# Patient Record
Sex: FEMALE | Race: ASIAN | NOT HISPANIC OR LATINO
[De-identification: names, ages, dates, MRNs, and addresses within clinical notes are randomized per-mention and may not be internally consistent; named-entity substitution may affect disease eponyms.]

---

## 2022-09-29 ENCOUNTER — NON-APPOINTMENT (OUTPATIENT)
Age: 22
End: 2022-09-29

## 2022-09-29 PROBLEM — Z00.00 ENCOUNTER FOR PREVENTIVE HEALTH EXAMINATION: Status: ACTIVE | Noted: 2022-09-29

## 2022-10-06 ENCOUNTER — ASOB RESULT (OUTPATIENT)
Age: 22
End: 2022-10-06

## 2022-10-06 ENCOUNTER — APPOINTMENT (OUTPATIENT)
Dept: ANTEPARTUM | Facility: CLINIC | Age: 22
End: 2022-10-06

## 2022-10-06 PROCEDURE — 76813 OB US NUCHAL MEAS 1 GEST: CPT

## 2022-10-06 PROCEDURE — 36415 COLL VENOUS BLD VENIPUNCTURE: CPT

## 2022-10-13 LAB
ADDITIONAL US: NORMAL
CRL SCAN TWIN B: NORMAL
CRL SCAN: NORMAL
CROWN RUMP LENGTH TWIN B: NORMAL
CROWN RUMP LENGTH: 63.5 MM
DIA MOM: 1.6
DIA VALUE: 423.2 PG/ML
DOWN SYNDROME AGE RISK: NORMAL
DOWN SYNDROME INTERPRETATION: NORMAL
DOWN SYNDROME SCREENING RISK: NORMAL
FIRST TRIMESTER SCREEN COMMENTS: NORMAL
FIRST TRIMESTER SCREEN NOTE: NORMAL
FIRST TRIMESTER SCREEN RESULTS: NORMAL
FIRST TRIMESTER SCREEN TEST RESULTS: NORMAL
GEST. AGE ON COLLECTION DATE: 12.6 WEEKS
HCG MOM: 1.7
HCG VALUE: 193.4 IU/ML
MATERNAL AGE AT EDD: 25 YR
NT MOM TWIN B: NORMAL
NT TWIN B: NORMAL
NUCHAL TRANSLUCENCY (NT): 1.5 MM
NUCHAL TRANSLUCENCY MOM: 1.12
NUMBER OF FETUSES: 1
RACE: NORMAL
SONOGRAPHER ID#: NORMAL
TRISOMY 18 AGE RISK: NORMAL
TRISOMY 18 INTERPRETATION: NORMAL
TRISOMY 18 SCREENING RISK: NORMAL
WEIGHT AFP: 119 LBS

## 2022-12-01 ENCOUNTER — ASOB RESULT (OUTPATIENT)
Age: 22
End: 2022-12-01

## 2022-12-01 ENCOUNTER — APPOINTMENT (OUTPATIENT)
Dept: ANTEPARTUM | Facility: CLINIC | Age: 22
End: 2022-12-01

## 2022-12-01 PROCEDURE — 76811 OB US DETAILED SNGL FETUS: CPT

## 2022-12-01 PROCEDURE — 76817 TRANSVAGINAL US OBSTETRIC: CPT

## 2023-02-03 ENCOUNTER — APPOINTMENT (OUTPATIENT)
Dept: MATERNAL FETAL MEDICINE | Facility: CLINIC | Age: 23
End: 2023-02-03
Payer: COMMERCIAL

## 2023-02-03 ENCOUNTER — ASOB RESULT (OUTPATIENT)
Age: 23
End: 2023-02-03

## 2023-02-03 PROCEDURE — G0108 DIAB MANAGE TRN  PER INDIV: CPT | Mod: 95

## 2023-02-06 ENCOUNTER — ASOB RESULT (OUTPATIENT)
Age: 23
End: 2023-02-06

## 2023-02-06 ENCOUNTER — APPOINTMENT (OUTPATIENT)
Dept: ANTEPARTUM | Facility: CLINIC | Age: 23
End: 2023-02-06
Payer: COMMERCIAL

## 2023-02-06 PROCEDURE — 76819 FETAL BIOPHYS PROFIL W/O NST: CPT

## 2023-02-06 PROCEDURE — 76816 OB US FOLLOW-UP PER FETUS: CPT

## 2023-02-27 ENCOUNTER — APPOINTMENT (OUTPATIENT)
Dept: ANTEPARTUM | Facility: CLINIC | Age: 23
End: 2023-02-27

## 2023-02-27 ENCOUNTER — APPOINTMENT (OUTPATIENT)
Dept: MATERNAL FETAL MEDICINE | Facility: CLINIC | Age: 23
End: 2023-02-27
Payer: COMMERCIAL

## 2023-02-27 VITALS
RESPIRATION RATE: 16 BRPM | WEIGHT: 128 LBS | HEART RATE: 72 BPM | BODY MASS INDEX: 23.55 KG/M2 | SYSTOLIC BLOOD PRESSURE: 88 MMHG | OXYGEN SATURATION: 99 % | DIASTOLIC BLOOD PRESSURE: 52 MMHG | HEIGHT: 62 IN

## 2023-02-27 DIAGNOSIS — Z82.49 FAMILY HISTORY OF ISCHEMIC HEART DISEASE AND OTHER DISEASES OF THE CIRCULATORY SYSTEM: ICD-10-CM

## 2023-02-27 DIAGNOSIS — O24.419 GESTATIONAL DIABETES MELLITUS IN PREGNANCY, UNSPECIFIED CONTROL: ICD-10-CM

## 2023-02-27 DIAGNOSIS — Z83.3 FAMILY HISTORY OF DIABETES MELLITUS: ICD-10-CM

## 2023-02-27 PROCEDURE — 99213 OFFICE O/P EST LOW 20 MIN: CPT

## 2023-02-27 RX ORDER — PRENATAL VIT NO.126/IRON/FOLIC 28MG-0.8MG
28-0.8 TABLET ORAL DAILY
Refills: 0 | Status: ACTIVE | COMMUNITY
Start: 2023-02-27

## 2023-02-27 RX ORDER — LANCETS 28 GAUGE
EACH MISCELLANEOUS
Qty: 100 | Refills: 0 | Status: ACTIVE | COMMUNITY
Start: 2023-01-16

## 2023-02-27 RX ORDER — NITROFURANTOIN (MONOHYDRATE/MACROCRYSTALS) 25; 75 MG/1; MG/1
100 CAPSULE ORAL
Qty: 14 | Refills: 0 | Status: DISCONTINUED | COMMUNITY
Start: 2022-10-04

## 2023-02-27 RX ORDER — BLOOD SUGAR DIAGNOSTIC
STRIP MISCELLANEOUS
Qty: 100 | Refills: 0 | Status: ACTIVE | COMMUNITY
Start: 2023-01-16

## 2023-02-27 RX ORDER — BLOOD-GLUCOSE METER
W/DEVICE KIT MISCELLANEOUS
Qty: 1 | Refills: 0 | Status: ACTIVE | COMMUNITY
Start: 2023-01-16

## 2023-02-27 NOTE — DISCUSSION/SUMMARY
[FreeTextEntry1] : The patient is a 22-year-old G1, P0 being seen today at 33 and half weeks for gestational diabetes A1.\par \par Fetal cardiac activity was documented using external [place].  Vital signs today revealed blood pressure of 88/52 and maternal weight is 128 pounds consistent with a BMI of 23.41 kg.  Her last growth scan was performed on  and revealed a single viable intrauterine gestation with the estimated fetal weight of 3 pounds 5 ounces which was consistent with the 31st percentile.  Vertex presentation with a posterior placenta was seen and the amniotic fluid index was normal at 14.52 cm.\par \par Gestational diabetes;\par \par Dietary consultation has been performed and report was sent under separate cover.  Evaluation of the patient's diabetic flowsheets demonstrates good control of fasting and postprandial blood sugars with all values in the normal range.  Medical intervention is not recommended at this time.  The patient has a follow-up growth ultrasound scheduled next week.  In addition follow-up dietary consultation is scheduled for the end of this week and that will be postponed and moved back 2 weeks.  Weekly  testing beginning at 36 weeks will be recommended until delivery.  Problems and complications related to a diabetic pregnancy including fetal macrosomia, increased risk for operative vaginal delivery and  section, shoulder dystocia with associated morbidity mortality and increased incidence of  intensive care admission were discussed.  All of the above was discussed with the patient and her  and all of their questions were answered.\par \par COVID-19 vaccination;\par \par COVID-19 vaccination pregnancy was discussed.  The patient has received her initial vaccination with a [place] vaccine in her country.  Risks and benefits of booster vaccination were discussed and after all the patient's questions were answered the patient has declined booster vaccination during pregnancy.  She will contact your office should she have a COVID infection to discuss various treatment options.\par \par Her father has diabetes and high blood pressure.  She has no previous medical or surgical history.  She has no known allergies to medications and denies alcohol, tobacco or drug use.\par \par I spent a total of 26 minutes reviewing the patient's prenatal record, outside medical records, previous consultations and ultrasound reports counseling and coordinating care.\par \par Recommendations;\par \par 1.  Continue current ADA diet and home glucose monitoring.\par 2.  Growth scan is scheduled for next week.\par 3.  Dietary consultation in 2 weeks is recommended.\par 4.  Weekly  testing beginning at 36 weeks until delivery.\par 5.  Follow-up maternal-fetal medicine consultation as clinically indicated.

## 2023-03-06 ENCOUNTER — APPOINTMENT (OUTPATIENT)
Dept: ANTEPARTUM | Facility: CLINIC | Age: 23
End: 2023-03-06
Payer: COMMERCIAL

## 2023-03-06 ENCOUNTER — ASOB RESULT (OUTPATIENT)
Age: 23
End: 2023-03-06

## 2023-03-06 PROCEDURE — 76819 FETAL BIOPHYS PROFIL W/O NST: CPT

## 2023-03-06 PROCEDURE — 76816 OB US FOLLOW-UP PER FETUS: CPT

## 2023-03-20 ENCOUNTER — ASOB RESULT (OUTPATIENT)
Age: 23
End: 2023-03-20

## 2023-03-20 ENCOUNTER — APPOINTMENT (OUTPATIENT)
Dept: MATERNAL FETAL MEDICINE | Facility: CLINIC | Age: 23
End: 2023-03-20
Payer: COMMERCIAL

## 2023-03-20 PROCEDURE — G0108 DIAB MANAGE TRN  PER INDIV: CPT | Mod: 95

## 2023-03-21 ENCOUNTER — APPOINTMENT (OUTPATIENT)
Dept: ANTEPARTUM | Facility: CLINIC | Age: 23
End: 2023-03-21
Payer: COMMERCIAL

## 2023-03-21 ENCOUNTER — ASOB RESULT (OUTPATIENT)
Age: 23
End: 2023-03-21

## 2023-03-21 ENCOUNTER — OUTPATIENT (OUTPATIENT)
Dept: INPATIENT UNIT | Facility: HOSPITAL | Age: 23
LOS: 1 days | Discharge: HOME | End: 2023-03-21
Payer: COMMERCIAL

## 2023-03-21 DIAGNOSIS — Z3A.00 WEEKS OF GESTATION OF PREGNANCY NOT SPECIFIED: ICD-10-CM

## 2023-03-21 DIAGNOSIS — O26.899 OTHER SPECIFIED PREGNANCY RELATED CONDITIONS, UNSPECIFIED TRIMESTER: ICD-10-CM

## 2023-03-21 LAB
ABO RH CONFIRMATION: SIGNIFICANT CHANGE UP
ALBUMIN SERPL ELPH-MCNC: 2.6 G/DL — LOW (ref 3.3–5)
ALP SERPL-CCNC: 174 U/L — HIGH (ref 40–120)
ALT FLD-CCNC: 13 U/L — SIGNIFICANT CHANGE UP (ref 12–78)
ANION GAP SERPL CALC-SCNC: 7 MMOL/L — SIGNIFICANT CHANGE UP (ref 5–17)
AST SERPL-CCNC: 20 U/L — SIGNIFICANT CHANGE UP (ref 15–37)
BILIRUB SERPL-MCNC: 0.3 MG/DL — SIGNIFICANT CHANGE UP (ref 0.2–1.2)
BLD GP AB SCN SERPL QL: SIGNIFICANT CHANGE UP
BUN SERPL-MCNC: 8 MG/DL — SIGNIFICANT CHANGE UP (ref 7–23)
CALCIUM SERPL-MCNC: 9 MG/DL — SIGNIFICANT CHANGE UP (ref 8.5–10.1)
CHLORIDE SERPL-SCNC: 109 MMOL/L — HIGH (ref 96–108)
CO2 SERPL-SCNC: 19 MMOL/L — LOW (ref 22–31)
CREAT SERPL-MCNC: 0.44 MG/DL — LOW (ref 0.5–1.3)
EGFR: 138 ML/MIN/1.73M2 — SIGNIFICANT CHANGE UP
GLUCOSE SERPL-MCNC: 81 MG/DL — SIGNIFICANT CHANGE UP (ref 70–99)
HCT VFR BLD CALC: 39.5 % — SIGNIFICANT CHANGE UP (ref 34.5–45)
HGB BLD-MCNC: 13.1 G/DL — SIGNIFICANT CHANGE UP (ref 11.5–15.5)
MCHC RBC-ENTMCNC: 28.9 PG — SIGNIFICANT CHANGE UP (ref 27–34)
MCHC RBC-ENTMCNC: 33.2 GM/DL — SIGNIFICANT CHANGE UP (ref 32–36)
MCV RBC AUTO: 87.2 FL — SIGNIFICANT CHANGE UP (ref 80–100)
PLATELET # BLD AUTO: 133 K/UL — LOW (ref 150–400)
POTASSIUM SERPL-MCNC: 3.7 MMOL/L — SIGNIFICANT CHANGE UP (ref 3.5–5.3)
POTASSIUM SERPL-SCNC: 3.7 MMOL/L — SIGNIFICANT CHANGE UP (ref 3.5–5.3)
PROT SERPL-MCNC: 6.6 GM/DL — SIGNIFICANT CHANGE UP (ref 6–8.3)
RBC # BLD: 4.53 M/UL — SIGNIFICANT CHANGE UP (ref 3.8–5.2)
RBC # FLD: 14.6 % — HIGH (ref 10.3–14.5)
SODIUM SERPL-SCNC: 135 MMOL/L — SIGNIFICANT CHANGE UP (ref 135–145)
WBC # BLD: 5.82 K/UL — SIGNIFICANT CHANGE UP (ref 3.8–10.5)
WBC # FLD AUTO: 5.82 K/UL — SIGNIFICANT CHANGE UP (ref 3.8–10.5)

## 2023-03-21 PROCEDURE — 86901 BLOOD TYPING SEROLOGIC RH(D): CPT

## 2023-03-21 PROCEDURE — 87081 CULTURE SCREEN ONLY: CPT

## 2023-03-21 PROCEDURE — 87491 CHLMYD TRACH DNA AMP PROBE: CPT

## 2023-03-21 PROCEDURE — 87591 N.GONORRHOEAE DNA AMP PROB: CPT

## 2023-03-21 PROCEDURE — 59025 FETAL NON-STRESS TEST: CPT

## 2023-03-21 PROCEDURE — 86645 CMV ANTIBODY IGM: CPT

## 2023-03-21 PROCEDURE — 86778 TOXOPLASMA ANTIBODY IGM: CPT

## 2023-03-21 PROCEDURE — 86644 CMV ANTIBODY: CPT

## 2023-03-21 PROCEDURE — 99214 OFFICE O/P EST MOD 30 MIN: CPT

## 2023-03-21 PROCEDURE — 86777 TOXOPLASMA ANTIBODY: CPT

## 2023-03-21 PROCEDURE — 86780 TREPONEMA PALLIDUM: CPT

## 2023-03-21 PROCEDURE — 85027 COMPLETE CBC AUTOMATED: CPT

## 2023-03-21 PROCEDURE — 76819 FETAL BIOPHYS PROFIL W/O NST: CPT

## 2023-03-21 PROCEDURE — 86747 PARVOVIRUS ANTIBODY: CPT

## 2023-03-21 PROCEDURE — 86900 BLOOD TYPING SEROLOGIC ABO: CPT

## 2023-03-21 PROCEDURE — 80053 COMPREHEN METABOLIC PANEL: CPT

## 2023-03-21 PROCEDURE — 86850 RBC ANTIBODY SCREEN: CPT

## 2023-03-21 PROCEDURE — 36415 COLL VENOUS BLD VENIPUNCTURE: CPT

## 2023-03-21 PROCEDURE — 96372 THER/PROPH/DIAG INJ SC/IM: CPT

## 2023-03-21 RX ADMIN — Medication 12 MILLIGRAM(S): at 20:51

## 2023-03-22 ENCOUNTER — OUTPATIENT (OUTPATIENT)
Dept: INPATIENT UNIT | Facility: HOSPITAL | Age: 23
LOS: 1 days | Discharge: HOME | End: 2023-03-22
Payer: COMMERCIAL

## 2023-03-22 DIAGNOSIS — Z3A.00 WEEKS OF GESTATION OF PREGNANCY NOT SPECIFIED: ICD-10-CM

## 2023-03-22 DIAGNOSIS — O26.899 OTHER SPECIFIED PREGNANCY RELATED CONDITIONS, UNSPECIFIED TRIMESTER: ICD-10-CM

## 2023-03-22 LAB
C TRACH RRNA SPEC QL NAA+PROBE: SIGNIFICANT CHANGE UP
CMV IGG FLD QL: 7.5 U/ML — HIGH
CMV IGG SERPL-IMP: POSITIVE
CMV IGM FLD-ACNC: <8 AU/ML — SIGNIFICANT CHANGE UP
CMV IGM SERPL QL: NEGATIVE — SIGNIFICANT CHANGE UP
N GONORRHOEA RRNA SPEC QL NAA+PROBE: SIGNIFICANT CHANGE UP
SPECIMEN SOURCE: SIGNIFICANT CHANGE UP
T GONDII IGG SER QL: <3 IU/ML — SIGNIFICANT CHANGE UP
T GONDII IGG SER QL: NEGATIVE — SIGNIFICANT CHANGE UP
T GONDII IGM SER QL: <3 AU/ML — SIGNIFICANT CHANGE UP
T GONDII IGM SER QL: NEGATIVE — SIGNIFICANT CHANGE UP
T PALLIDUM AB TITR SER: NEGATIVE — SIGNIFICANT CHANGE UP

## 2023-03-22 PROCEDURE — 96372 THER/PROPH/DIAG INJ SC/IM: CPT

## 2023-03-22 PROCEDURE — 99214 OFFICE O/P EST MOD 30 MIN: CPT

## 2023-03-22 PROCEDURE — 59025 FETAL NON-STRESS TEST: CPT

## 2023-03-22 RX ADMIN — Medication 12 MILLIGRAM(S): at 21:58

## 2023-03-23 DIAGNOSIS — O26.899 OTHER SPECIFIED PREGNANCY RELATED CONDITIONS, UNSPECIFIED TRIMESTER: ICD-10-CM

## 2023-03-23 DIAGNOSIS — Z3A.00 WEEKS OF GESTATION OF PREGNANCY NOT SPECIFIED: ICD-10-CM

## 2023-03-23 LAB
CULTURE RESULTS: SIGNIFICANT CHANGE UP
SPECIMEN SOURCE: SIGNIFICANT CHANGE UP

## 2023-03-24 DIAGNOSIS — Z3A.00 WEEKS OF GESTATION OF PREGNANCY NOT SPECIFIED: ICD-10-CM

## 2023-03-24 DIAGNOSIS — O26.899 OTHER SPECIFIED PREGNANCY RELATED CONDITIONS, UNSPECIFIED TRIMESTER: ICD-10-CM

## 2023-03-28 LAB
B19V IGG SER-ACNC: 0.4 INDEX — SIGNIFICANT CHANGE UP (ref 0–0.9)
B19V IGG+IGM SER-IMP: NEGATIVE — SIGNIFICANT CHANGE UP
B19V IGG+IGM SER-IMP: SIGNIFICANT CHANGE UP
B19V IGM FLD-ACNC: 0.22 INDEX — SIGNIFICANT CHANGE UP (ref 0–0.9)
B19V IGM SER-ACNC: NEGATIVE — SIGNIFICANT CHANGE UP

## 2023-03-31 ENCOUNTER — APPOINTMENT (OUTPATIENT)
Dept: ANTEPARTUM | Facility: CLINIC | Age: 23
End: 2023-03-31
Payer: COMMERCIAL

## 2023-03-31 ENCOUNTER — ASOB RESULT (OUTPATIENT)
Age: 23
End: 2023-03-31

## 2023-03-31 PROCEDURE — 76819 FETAL BIOPHYS PROFIL W/O NST: CPT

## 2023-03-31 PROCEDURE — 76816 OB US FOLLOW-UP PER FETUS: CPT

## 2023-04-04 ENCOUNTER — APPOINTMENT (OUTPATIENT)
Dept: ANTEPARTUM | Facility: CLINIC | Age: 23
End: 2023-04-04

## 2023-04-04 ENCOUNTER — APPOINTMENT (OUTPATIENT)
Dept: MATERNAL FETAL MEDICINE | Facility: CLINIC | Age: 23
End: 2023-04-04
Payer: COMMERCIAL

## 2023-04-04 ENCOUNTER — ASOB RESULT (OUTPATIENT)
Age: 23
End: 2023-04-04

## 2023-04-04 PROCEDURE — G0108 DIAB MANAGE TRN  PER INDIV: CPT | Mod: 95

## 2023-04-07 ENCOUNTER — APPOINTMENT (OUTPATIENT)
Dept: ANTEPARTUM | Facility: CLINIC | Age: 23
End: 2023-04-07
Payer: COMMERCIAL

## 2023-04-07 ENCOUNTER — ASOB RESULT (OUTPATIENT)
Age: 23
End: 2023-04-07

## 2023-04-07 PROCEDURE — 76819 FETAL BIOPHYS PROFIL W/O NST: CPT

## 2023-04-12 ENCOUNTER — INPATIENT (INPATIENT)
Facility: HOSPITAL | Age: 23
LOS: 2 days | Discharge: HOME | End: 2023-04-15
Attending: OBSTETRICS & GYNECOLOGY | Admitting: OBSTETRICS & GYNECOLOGY
Payer: COMMERCIAL

## 2023-04-12 VITALS
WEIGHT: 128.09 LBS | SYSTOLIC BLOOD PRESSURE: 108 MMHG | HEART RATE: 101 BPM | RESPIRATION RATE: 16 BRPM | TEMPERATURE: 98 F | HEIGHT: 62 IN | DIASTOLIC BLOOD PRESSURE: 62 MMHG

## 2023-04-12 DIAGNOSIS — O35.9XX0 MATERNAL CARE FOR (SUSPECTED) FETAL ABNORMALITY AND DAMAGE, UNSPECIFIED, NOT APPLICABLE OR UNSPECIFIED: ICD-10-CM

## 2023-04-12 LAB
GLUCOSE BLDC GLUCOMTR-MCNC: 71 MG/DL — SIGNIFICANT CHANGE UP (ref 70–99)
HCT VFR BLD CALC: 41.6 % — SIGNIFICANT CHANGE UP (ref 34.5–45)
HGB BLD-MCNC: 13.8 G/DL — SIGNIFICANT CHANGE UP (ref 11.5–15.5)
IANC: 4.35 K/UL — SIGNIFICANT CHANGE UP (ref 1.8–7.4)
MCHC RBC-ENTMCNC: 28.8 PG — SIGNIFICANT CHANGE UP (ref 27–34)
MCHC RBC-ENTMCNC: 33.2 GM/DL — SIGNIFICANT CHANGE UP (ref 32–36)
MCV RBC AUTO: 86.7 FL — SIGNIFICANT CHANGE UP (ref 80–100)
PLATELET # BLD AUTO: 101 K/UL — LOW (ref 150–400)
RBC # BLD: 4.8 M/UL — SIGNIFICANT CHANGE UP (ref 3.8–5.2)
RBC # FLD: 15.9 % — HIGH (ref 10.3–14.5)
WBC # BLD: 6.94 K/UL — SIGNIFICANT CHANGE UP (ref 3.8–10.5)
WBC # FLD AUTO: 6.94 K/UL — SIGNIFICANT CHANGE UP (ref 3.8–10.5)

## 2023-04-12 RX ORDER — CHLORHEXIDINE GLUCONATE 213 G/1000ML
1 SOLUTION TOPICAL ONCE
Refills: 0 | Status: DISCONTINUED | OUTPATIENT
Start: 2023-04-12 | End: 2023-04-13

## 2023-04-12 RX ORDER — SODIUM CHLORIDE 9 MG/ML
1000 INJECTION, SOLUTION INTRAVENOUS
Refills: 0 | Status: DISCONTINUED | OUTPATIENT
Start: 2023-04-12 | End: 2023-04-13

## 2023-04-12 RX ORDER — CITRIC ACID/SODIUM CITRATE 300-500 MG
15 SOLUTION, ORAL ORAL EVERY 6 HOURS
Refills: 0 | Status: DISCONTINUED | OUTPATIENT
Start: 2023-04-12 | End: 2023-04-13

## 2023-04-12 RX ORDER — SODIUM CHLORIDE 9 MG/ML
1000 INJECTION INTRAMUSCULAR; INTRAVENOUS; SUBCUTANEOUS
Refills: 0 | Status: DISCONTINUED | OUTPATIENT
Start: 2023-04-12 | End: 2023-04-13

## 2023-04-12 RX ORDER — OXYTOCIN 10 UNIT/ML
333.33 VIAL (ML) INJECTION
Qty: 20 | Refills: 0 | Status: DISCONTINUED | OUTPATIENT
Start: 2023-04-12 | End: 2023-04-13

## 2023-04-12 NOTE — OB PROVIDER H&P - ASSESSMENT
Assessment  22y   @ 39w4d presents for IOL for GDMA1.     Plan  1. Admit to L+D. Routine Labs. IVF.  2. IOL w/ buccal cytotec.  3. Fetus: cat 1 tracing. VTX. EFW 3350g by sono. Continuous EFM. Sono. No concerns.  4. Prenatal issues: none  5. GBS neg  6. Pain: IV pain meds/epidural PRN    Plan per attending physician, Dr. Vito Kennedy MD  PGY1

## 2023-04-12 NOTE — OB PROVIDER H&P - HISTORY OF PRESENT ILLNESS
R1 Admission H&P    Subjective  HPI: 22y  @ 39w4d presents for IOL for A1 and fetus found to have bilateral pleural effusions.   +FM. -LOF. -CTXs. -VB. Pt denies any other concerns.    – PNC: Denies prenatal issues. GBS neg. EFW 3350g by ender.  – OBHx: P0  – GynHx: denies fibroids, cysts, endometriosis, abnormal pap smears, STIs  – PMH: denies  – PSH: denies  – Psych: denies   – Social: denies   – Meds: PNV   – Allergies: NKDA  – Will accept blood transfusions? Yes

## 2023-04-12 NOTE — OB PROVIDER H&P - NSHPPHYSICALEXAM_GEN_ALL_CORE
Objective  – VS  T(C): 36.9 (04-12-23 @ 21:32)  HR: 99 (04-12-23 @ 21:32)  BP: 108/62 (04-12-23 @ 21:32)  RR: 16 (04-12-23 @ 21:32)  SpO2: 96% (04-12-23 @ 21:32)    Physical Exam  CV: RRR  Pulm: breathing comfortably on RA  Abd: gravid, nontender  Extr: moving all extremities with ease    – Sono: vertex

## 2023-04-12 NOTE — OB RN PATIENT PROFILE - FALL HARM RISK - UNIVERSAL INTERVENTIONS
Bed in lowest position, wheels locked, appropriate side rails in place/Call bell, personal items and telephone in reach/Instruct patient to call for assistance before getting out of bed or chair/Non-slip footwear when patient is out of bed/[place] to call system/Physically safe environment - no spills, clutter or unnecessary equipment/Purposeful Proactive Rounding/Room/bathroom lighting operational, light cord in reach

## 2023-04-13 LAB
ALBUMIN SERPL ELPH-MCNC: 3.1 G/DL — LOW (ref 3.3–5)
ALP SERPL-CCNC: 210 U/L — HIGH (ref 40–120)
ALT FLD-CCNC: 13 U/L — SIGNIFICANT CHANGE UP (ref 4–33)
ANION GAP SERPL CALC-SCNC: 14 MMOL/L — SIGNIFICANT CHANGE UP (ref 7–14)
ANISOCYTOSIS BLD QL: SLIGHT — SIGNIFICANT CHANGE UP
APTT BLD: 25.4 SEC — LOW (ref 27–36.3)
APTT BLD: 27.5 SEC — SIGNIFICANT CHANGE UP (ref 27–36.3)
AST SERPL-CCNC: 19 U/L — SIGNIFICANT CHANGE UP (ref 4–32)
BASOPHILS # BLD AUTO: 0 K/UL — SIGNIFICANT CHANGE UP (ref 0–0.2)
BASOPHILS # BLD AUTO: 0.01 K/UL — SIGNIFICANT CHANGE UP (ref 0–0.2)
BASOPHILS NFR BLD AUTO: 0 % — SIGNIFICANT CHANGE UP (ref 0–2)
BASOPHILS NFR BLD AUTO: 0.1 % — SIGNIFICANT CHANGE UP (ref 0–2)
BILIRUB SERPL-MCNC: 0.3 MG/DL — SIGNIFICANT CHANGE UP (ref 0.2–1.2)
BLD GP AB SCN SERPL QL: NEGATIVE — SIGNIFICANT CHANGE UP
BUN SERPL-MCNC: 8 MG/DL — SIGNIFICANT CHANGE UP (ref 7–23)
CALCIUM SERPL-MCNC: 8.3 MG/DL — LOW (ref 8.4–10.5)
CHLORIDE SERPL-SCNC: 109 MMOL/L — HIGH (ref 98–107)
CO2 SERPL-SCNC: 14 MMOL/L — LOW (ref 22–31)
COVID-19 SPIKE DOMAIN AB INTERP: POSITIVE
COVID-19 SPIKE DOMAIN ANTIBODY RESULT: >250 U/ML — HIGH
CREAT SERPL-MCNC: 0.38 MG/DL — LOW (ref 0.5–1.3)
EGFR: 143 ML/MIN/1.73M2 — SIGNIFICANT CHANGE UP
EOSINOPHIL # BLD AUTO: 0 K/UL — SIGNIFICANT CHANGE UP (ref 0–0.5)
EOSINOPHIL # BLD AUTO: 0.12 K/UL — SIGNIFICANT CHANGE UP (ref 0–0.5)
EOSINOPHIL NFR BLD AUTO: 0 % — SIGNIFICANT CHANGE UP (ref 0–6)
EOSINOPHIL NFR BLD AUTO: 1.7 % — SIGNIFICANT CHANGE UP (ref 0–6)
FIBRINOGEN PPP-MCNC: 414 MG/DL — SIGNIFICANT CHANGE UP (ref 200–465)
FIBRINOGEN PPP-MCNC: 414 MG/DL — SIGNIFICANT CHANGE UP (ref 200–465)
GIANT PLATELETS BLD QL SMEAR: PRESENT — SIGNIFICANT CHANGE UP
GLUCOSE BLDC GLUCOMTR-MCNC: 102 MG/DL — HIGH (ref 70–99)
GLUCOSE BLDC GLUCOMTR-MCNC: 104 MG/DL — HIGH (ref 70–99)
GLUCOSE BLDC GLUCOMTR-MCNC: 117 MG/DL — HIGH (ref 70–99)
GLUCOSE BLDC GLUCOMTR-MCNC: 85 MG/DL — SIGNIFICANT CHANGE UP (ref 70–99)
GLUCOSE BLDC GLUCOMTR-MCNC: 90 MG/DL — SIGNIFICANT CHANGE UP (ref 70–99)
GLUCOSE BLDC GLUCOMTR-MCNC: 99 MG/DL — SIGNIFICANT CHANGE UP (ref 70–99)
GLUCOSE SERPL-MCNC: 102 MG/DL — HIGH (ref 70–99)
HCT VFR BLD CALC: 42.3 % — SIGNIFICANT CHANGE UP (ref 34.5–45)
HCT VFR BLD CALC: 42.7 % — SIGNIFICANT CHANGE UP (ref 34.5–45)
HGB BLD-MCNC: 13.3 G/DL — SIGNIFICANT CHANGE UP (ref 11.5–15.5)
HGB BLD-MCNC: 13.8 G/DL — SIGNIFICANT CHANGE UP (ref 11.5–15.5)
HYPOCHROMIA BLD QL: SLIGHT — SIGNIFICANT CHANGE UP
IANC: 7.64 K/UL — HIGH (ref 1.8–7.4)
IMM GRANULOCYTES NFR BLD AUTO: 0.5 % — SIGNIFICANT CHANGE UP (ref 0–0.9)
INR BLD: 0.97 RATIO — SIGNIFICANT CHANGE UP (ref 0.88–1.16)
INR BLD: 0.97 RATIO — SIGNIFICANT CHANGE UP (ref 0.88–1.16)
LYMPHOCYTES # BLD AUTO: 0.6 K/UL — LOW (ref 1–3.3)
LYMPHOCYTES # BLD AUTO: 0.75 K/UL — LOW (ref 1–3.3)
LYMPHOCYTES # BLD AUTO: 8.5 % — LOW (ref 13–44)
LYMPHOCYTES # BLD AUTO: 8.7 % — LOW (ref 13–44)
MACROCYTES BLD QL: SLIGHT — SIGNIFICANT CHANGE UP
MANUAL SMEAR VERIFICATION: SIGNIFICANT CHANGE UP
MCHC RBC-ENTMCNC: 28.4 PG — SIGNIFICANT CHANGE UP (ref 27–34)
MCHC RBC-ENTMCNC: 28.9 PG — SIGNIFICANT CHANGE UP (ref 27–34)
MCHC RBC-ENTMCNC: 31.4 GM/DL — LOW (ref 32–36)
MCHC RBC-ENTMCNC: 32.3 GM/DL — SIGNIFICANT CHANGE UP (ref 32–36)
MCV RBC AUTO: 89.3 FL — SIGNIFICANT CHANGE UP (ref 80–100)
MCV RBC AUTO: 90.2 FL — SIGNIFICANT CHANGE UP (ref 80–100)
MONOCYTES # BLD AUTO: 0.18 K/UL — SIGNIFICANT CHANGE UP (ref 0–0.9)
MONOCYTES # BLD AUTO: 0.38 K/UL — SIGNIFICANT CHANGE UP (ref 0–0.9)
MONOCYTES NFR BLD AUTO: 2.6 % — SIGNIFICANT CHANGE UP (ref 2–14)
MONOCYTES NFR BLD AUTO: 4.3 % — SIGNIFICANT CHANGE UP (ref 2–14)
NEUTROPHILS # BLD AUTO: 5.37 K/UL — SIGNIFICANT CHANGE UP (ref 1.8–7.4)
NEUTROPHILS # BLD AUTO: 7.64 K/UL — HIGH (ref 1.8–7.4)
NEUTROPHILS NFR BLD AUTO: 77.4 % — HIGH (ref 43–77)
NEUTROPHILS NFR BLD AUTO: 86.6 % — HIGH (ref 43–77)
NRBC # BLD: 0 /100 WBCS — SIGNIFICANT CHANGE UP (ref 0–0)
NRBC # BLD: 0 /100 WBCS — SIGNIFICANT CHANGE UP (ref 0–0)
NRBC # FLD: 0 K/UL — SIGNIFICANT CHANGE UP (ref 0–0)
NRBC # FLD: 0 K/UL — SIGNIFICANT CHANGE UP (ref 0–0)
PLAT MORPH BLD: ABNORMAL
PLATELET # BLD AUTO: 90 K/UL — LOW (ref 150–400)
PLATELET # BLD AUTO: 91 K/UL — LOW (ref 150–400)
PLATELET COUNT - ESTIMATE: ABNORMAL
POLYCHROMASIA BLD QL SMEAR: SLIGHT — SIGNIFICANT CHANGE UP
POTASSIUM SERPL-MCNC: 3.5 MMOL/L — SIGNIFICANT CHANGE UP (ref 3.5–5.3)
POTASSIUM SERPL-SCNC: 3.5 MMOL/L — SIGNIFICANT CHANGE UP (ref 3.5–5.3)
PROT SERPL-MCNC: 6 G/DL — SIGNIFICANT CHANGE UP (ref 6–8.3)
PROTHROM AB SERPL-ACNC: 11.2 SEC — SIGNIFICANT CHANGE UP (ref 10.5–13.4)
PROTHROM AB SERPL-ACNC: 11.3 SEC — SIGNIFICANT CHANGE UP (ref 10.5–13.4)
RBC # BLD: 4.69 M/UL — SIGNIFICANT CHANGE UP (ref 3.8–5.2)
RBC # BLD: 4.78 M/UL — SIGNIFICANT CHANGE UP (ref 3.8–5.2)
RBC # FLD: 16 % — HIGH (ref 10.3–14.5)
RBC # FLD: 16 % — HIGH (ref 10.3–14.5)
RBC BLD AUTO: ABNORMAL
RH IG SCN BLD-IMP: POSITIVE — SIGNIFICANT CHANGE UP
RH IG SCN BLD-IMP: POSITIVE — SIGNIFICANT CHANGE UP
SARS-COV-2 IGG+IGM SERPL QL IA: >250 U/ML — HIGH
SARS-COV-2 IGG+IGM SERPL QL IA: POSITIVE
SARS-COV-2 RNA SPEC QL NAA+PROBE: SIGNIFICANT CHANGE UP
SODIUM SERPL-SCNC: 137 MMOL/L — SIGNIFICANT CHANGE UP (ref 135–145)
T PALLIDUM AB TITR SER: NEGATIVE — SIGNIFICANT CHANGE UP
VARIANT LYMPHS # BLD: 9.6 % — HIGH (ref 0–6)
WBC # BLD: 8.82 K/UL — SIGNIFICANT CHANGE UP (ref 3.8–10.5)
WBC # BLD: 9.46 K/UL — SIGNIFICANT CHANGE UP (ref 3.8–10.5)
WBC # FLD AUTO: 8.82 K/UL — SIGNIFICANT CHANGE UP (ref 3.8–10.5)
WBC # FLD AUTO: 9.46 K/UL — SIGNIFICANT CHANGE UP (ref 3.8–10.5)

## 2023-04-13 PROCEDURE — 88307 TISSUE EXAM BY PATHOLOGIST: CPT | Mod: 26

## 2023-04-13 RX ORDER — SODIUM CHLORIDE 9 MG/ML
3 INJECTION INTRAMUSCULAR; INTRAVENOUS; SUBCUTANEOUS EVERY 8 HOURS
Refills: 0 | Status: DISCONTINUED | OUTPATIENT
Start: 2023-04-13 | End: 2023-04-15

## 2023-04-13 RX ORDER — AER TRAVELER 0.5 G/1
1 SOLUTION RECTAL; TOPICAL EVERY 4 HOURS
Refills: 0 | Status: DISCONTINUED | OUTPATIENT
Start: 2023-04-13 | End: 2023-04-15

## 2023-04-13 RX ORDER — SIMETHICONE 80 MG/1
80 TABLET, CHEWABLE ORAL EVERY 4 HOURS
Refills: 0 | Status: DISCONTINUED | OUTPATIENT
Start: 2023-04-13 | End: 2023-04-15

## 2023-04-13 RX ORDER — LANOLIN
1 OINTMENT (GRAM) TOPICAL EVERY 6 HOURS
Refills: 0 | Status: DISCONTINUED | OUTPATIENT
Start: 2023-04-13 | End: 2023-04-15

## 2023-04-13 RX ORDER — ACETAMINOPHEN 500 MG
975 TABLET ORAL
Refills: 0 | Status: DISCONTINUED | OUTPATIENT
Start: 2023-04-13 | End: 2023-04-15

## 2023-04-13 RX ORDER — OXYTOCIN 10 UNIT/ML
VIAL (ML) INJECTION
Qty: 30 | Refills: 0 | Status: DISCONTINUED | OUTPATIENT
Start: 2023-04-13 | End: 2023-04-13

## 2023-04-13 RX ORDER — OXYTOCIN 10 UNIT/ML
41.67 VIAL (ML) INJECTION
Qty: 20 | Refills: 0 | Status: DISCONTINUED | OUTPATIENT
Start: 2023-04-13 | End: 2023-04-15

## 2023-04-13 RX ORDER — IBUPROFEN 200 MG
600 TABLET ORAL EVERY 6 HOURS
Refills: 0 | Status: COMPLETED | OUTPATIENT
Start: 2023-04-13 | End: 2024-03-11

## 2023-04-13 RX ORDER — DIPHENHYDRAMINE HCL 50 MG
25 CAPSULE ORAL EVERY 6 HOURS
Refills: 0 | Status: DISCONTINUED | OUTPATIENT
Start: 2023-04-13 | End: 2023-04-15

## 2023-04-13 RX ORDER — OXYCODONE HYDROCHLORIDE 5 MG/1
5 TABLET ORAL ONCE
Refills: 0 | Status: DISCONTINUED | OUTPATIENT
Start: 2023-04-13 | End: 2023-04-15

## 2023-04-13 RX ORDER — DIBUCAINE 1 %
1 OINTMENT (GRAM) RECTAL EVERY 6 HOURS
Refills: 0 | Status: DISCONTINUED | OUTPATIENT
Start: 2023-04-13 | End: 2023-04-15

## 2023-04-13 RX ORDER — BENZOCAINE 10 %
1 GEL (GRAM) MUCOUS MEMBRANE EVERY 6 HOURS
Refills: 0 | Status: DISCONTINUED | OUTPATIENT
Start: 2023-04-13 | End: 2023-04-15

## 2023-04-13 RX ORDER — KETOROLAC TROMETHAMINE 30 MG/ML
30 SYRINGE (ML) INJECTION ONCE
Refills: 0 | Status: DISCONTINUED | OUTPATIENT
Start: 2023-04-13 | End: 2023-04-15

## 2023-04-13 RX ORDER — MAGNESIUM HYDROXIDE 400 MG/1
30 TABLET, CHEWABLE ORAL
Refills: 0 | Status: DISCONTINUED | OUTPATIENT
Start: 2023-04-13 | End: 2023-04-15

## 2023-04-13 RX ORDER — ACETAMINOPHEN 500 MG
1000 TABLET ORAL ONCE
Refills: 0 | Status: COMPLETED | OUTPATIENT
Start: 2023-04-13 | End: 2023-04-13

## 2023-04-13 RX ORDER — HYDROCORTISONE 1 %
1 OINTMENT (GRAM) TOPICAL EVERY 6 HOURS
Refills: 0 | Status: DISCONTINUED | OUTPATIENT
Start: 2023-04-13 | End: 2023-04-15

## 2023-04-13 RX ORDER — OXYCODONE HYDROCHLORIDE 5 MG/1
5 TABLET ORAL
Refills: 0 | Status: DISCONTINUED | OUTPATIENT
Start: 2023-04-13 | End: 2023-04-15

## 2023-04-13 RX ORDER — PRAMOXINE HYDROCHLORIDE 150 MG/15G
1 AEROSOL, FOAM RECTAL EVERY 4 HOURS
Refills: 0 | Status: DISCONTINUED | OUTPATIENT
Start: 2023-04-13 | End: 2023-04-15

## 2023-04-13 RX ORDER — TETANUS TOXOID, REDUCED DIPHTHERIA TOXOID AND ACELLULAR PERTUSSIS VACCINE, ADSORBED 5; 2.5; 8; 8; 2.5 [IU]/.5ML; [IU]/.5ML; UG/.5ML; UG/.5ML; UG/.5ML
0.5 SUSPENSION INTRAMUSCULAR ONCE
Refills: 0 | Status: COMPLETED | OUTPATIENT
Start: 2023-04-13

## 2023-04-13 RX ADMIN — Medication 125 MILLIUNIT(S)/MIN: at 19:12

## 2023-04-13 RX ADMIN — Medication 400 MILLIGRAM(S): at 10:28

## 2023-04-13 RX ADMIN — Medication 15 MILLILITER(S): at 06:34

## 2023-04-13 RX ADMIN — Medication 1000 MILLIGRAM(S): at 11:00

## 2023-04-13 RX ADMIN — Medication 2 MILLIUNIT(S)/MIN: at 14:35

## 2023-04-13 NOTE — OB PROVIDER LABOR PROGRESS NOTE - NS_SUBJECTIVE/OBJECTIVE_OBGYN_ALL_OB_FT
R1 Labor & Delivery Progress Note     T(C): 36.7 (23 @ 05:38), Max: 36.9 (23 @ 21:32)  HR: 96 (23 @ 08:36) (54 - 101)  BP: 121/73 (23 @ 05:38) (107/68 - 121/73)  RR: 18 (23 @ 05:38) (16 - 18)  SpO2: 97% (23 @ 08:36) (81% - 99%)    Plan: 22y y/o  @39w5d in stable condition evaluated following 2 minute deceleration. Patient found to be 4/80/-3; initial exam was 0/0/-3 at 1130p (3/12). Patient reporting worsening pain w/ contractions. Pt will be 4 an epidural when she is transferred to labor and delivery.   - LLD position   - Con't IOL  - Continuous EFM, [place]  - Con't IVF    d/w attending physician Dr. Vito Mora  PGY-1
R1 Labor & Delivery Progress Note     T(C): 36.9 (23 @ 13:06), Max: 36.9 (23 @ 21:32)  HR: 90 (23 @ 14:13) (54 - 129)  BP: 89/51 (23 @ 14:13) (82/45 - 127/82)  RR: 16 (23 @ 13:06) (16 - 18)  SpO2: 94% (23 @ 14:15) (81% - 99%)    Plan: 22y y/o  @39w5d in stable condition found to be unchanged from exam 1.5hr prior. Ctx's now q4min.  - Con't IOL w/ pitocin @230p  - Continuous EFM, [place]  - Con't IVF    d/w attending physician Dr. Vito Mora  PGY-1

## 2023-04-13 NOTE — OB NEONATOLOGY/PEDIATRICIAN DELIVERY SUMMARY - NSPEDSNEONOTESA_OBGYN_ALL_OB_FT
NICU called for fetal alert: b/l pleural effusions on fetal US; and heavy mec. 39.5wk male born via  to a 21 y/o  blood type O+ mother. Maternal history of GDMA1. Prenatal history of b/l pleural effusions. No other significant maternal or prenatal history. PNL -/-/NR/I, GBS - on 3/21. AROM at 1500 with heavy meconium fluids. Baby emerged vigorous, crying, was w/d/s/s with APGARS of 9/9. Voided in L&D. On blowby for lower than expected sats from 2 to 7 MOL. Pt tachypneic at 20 MOL, descision to admit to NICU for CPAP. Mom wishes to initiate breastfeeding, consentss Hep B vaccine and consents circ.  EOS 0.13, maternal TMax of 37.2.    Physical Exam:  Gen: NAD, +grimace  HEENT: anterior fontanel open soft and flat, no cleft lip/palate, ears normal set, no ear pits or tags. no lesions in mouth/throat, nares clinically patent  Resp: no increased work of breathing, good air entry b/l, clear to auscultation bilaterally  Cardio: Normal S1/S2, regular rate and rhythm, no murmurs, rubs or gallops  Abd: soft, non tender, non distended, + bowel sounds, umbilical cord with 3 vessels  Neuro: +grasp/suck/mandy, normal tone  Extremities: negative michael and ortolani, moving all extremities, full range of motion x 4, no crepitus  Skin: pink, warm  Genitals: Normal male anatomy, testicles palpable in scrotum b/l, Will 1, anus patent

## 2023-04-13 NOTE — OB RN DELIVERY SUMMARY - NSSELHIDDEN_OBGYN_ALL_OB_FT
[NS_DeliveryAttending1_OBGYN_ALL_OB_FT:[id number]],[NS_DeliveryRN_OBGYN_ALL_OB_FT:[id number]]

## 2023-04-13 NOTE — OB RN DELIVERY SUMMARY - NS_SEPSISRSKCALC_OBGYN_ALL_OB_FT
EOS calculated successfully. EOS Risk Factor: 0.5/1000 live births ([facility] national incidence); GA=39w5d; Temp=98.96; ROM=3.433; GBS='Negative'; Antibiotics='No antibiotics or any antibiotics < 2 hrs prior to birth'

## 2023-04-13 NOTE — OB PROVIDER DELIVERY SUMMARY - NSPROVIDERDELIVERYNOTE_OBGYN_ALL_OB_FT
pt is s/p    she was induced secondary to fetal lung pleural effusion diagnosed in the third trimester and no anyother findings mom is gdma1 and gestational thrombocytopenia   she was s/p buccaul cytotec and pit and she becomes fully dilated and she delivered sponntaneously vaginally and fetus was in ANOOP and loose cord around loose released easily and both shoulders followed by whole body and cord was cut and baby boy was handed to NICU and cord blood collected   and placenta delivered spontaneously complete and intact and sent for pathology   2nd degree midline tear repeaired with 2-0 chromic and pt tolerated the delivery well

## 2023-04-13 NOTE — OB PROVIDER DELIVERY SUMMARY - NSLOWPPHRISK_OBGYN_A_OB
No previous uterine incision/[place] Pregnancy/Less than or equal to 4 previous vaginal births/No known bleeding disorder/No history of postpartum hemorrhage/No other PPH risks indicated

## 2023-04-14 ENCOUNTER — APPOINTMENT (OUTPATIENT)
Dept: ANTEPARTUM | Facility: CLINIC | Age: 23
End: 2023-04-14

## 2023-04-14 RX ORDER — TETANUS TOXOID, REDUCED DIPHTHERIA TOXOID AND ACELLULAR PERTUSSIS VACCINE, ADSORBED 5; 2.5; 8; 8; 2.5 [IU]/.5ML; [IU]/.5ML; UG/.5ML; UG/.5ML; UG/.5ML
0.5 SUSPENSION INTRAMUSCULAR ONCE
Refills: 0 | Status: COMPLETED | OUTPATIENT
Start: 2023-04-14 | End: 2023-04-14

## 2023-04-14 RX ORDER — IBUPROFEN 200 MG
600 TABLET ORAL EVERY 6 HOURS
Refills: 0 | Status: DISCONTINUED | OUTPATIENT
Start: 2023-04-14 | End: 2023-04-15

## 2023-04-14 RX ADMIN — PRAMOXINE HYDROCHLORIDE 1 APPLICATION(S): 150 AEROSOL, FOAM RECTAL at 21:43

## 2023-04-14 RX ADMIN — Medication 600 MILLIGRAM(S): at 18:30

## 2023-04-14 RX ADMIN — Medication 975 MILLIGRAM(S): at 16:22

## 2023-04-14 RX ADMIN — Medication 600 MILLIGRAM(S): at 12:12

## 2023-04-14 RX ADMIN — Medication 975 MILLIGRAM(S): at 04:26

## 2023-04-14 RX ADMIN — SODIUM CHLORIDE 3 MILLILITER(S): 9 INJECTION INTRAMUSCULAR; INTRAVENOUS; SUBCUTANEOUS at 14:20

## 2023-04-14 RX ADMIN — Medication 600 MILLIGRAM(S): at 06:02

## 2023-04-14 RX ADMIN — Medication 975 MILLIGRAM(S): at 15:39

## 2023-04-14 RX ADMIN — Medication 975 MILLIGRAM(S): at 09:22

## 2023-04-14 RX ADMIN — Medication 1 APPLICATION(S): at 21:43

## 2023-04-14 RX ADMIN — Medication 975 MILLIGRAM(S): at 21:43

## 2023-04-14 RX ADMIN — SODIUM CHLORIDE 3 MILLILITER(S): 9 INJECTION INTRAMUSCULAR; INTRAVENOUS; SUBCUTANEOUS at 21:43

## 2023-04-14 RX ADMIN — SODIUM CHLORIDE 3 MILLILITER(S): 9 INJECTION INTRAMUSCULAR; INTRAVENOUS; SUBCUTANEOUS at 01:46

## 2023-04-14 RX ADMIN — Medication 975 MILLIGRAM(S): at 08:39

## 2023-04-14 RX ADMIN — SODIUM CHLORIDE 3 MILLILITER(S): 9 INJECTION INTRAMUSCULAR; INTRAVENOUS; SUBCUTANEOUS at 05:17

## 2023-04-14 RX ADMIN — Medication 600 MILLIGRAM(S): at 11:34

## 2023-04-14 RX ADMIN — Medication 975 MILLIGRAM(S): at 03:50

## 2023-04-14 RX ADMIN — Medication 975 MILLIGRAM(S): at 22:15

## 2023-04-14 RX ADMIN — Medication 600 MILLIGRAM(S): at 17:52

## 2023-04-14 RX ADMIN — Medication 600 MILLIGRAM(S): at 06:42

## 2023-04-14 RX ADMIN — AER TRAVELER 1 APPLICATION(S): 0.5 SOLUTION RECTAL; TOPICAL at 21:43

## 2023-04-14 RX ADMIN — TETANUS TOXOID, REDUCED DIPHTHERIA TOXOID AND ACELLULAR PERTUSSIS VACCINE, ADSORBED 0.5 MILLILITER(S): 5; 2.5; 8; 8; 2.5 SUSPENSION INTRAMUSCULAR at 05:59

## 2023-04-14 RX ADMIN — Medication 1 TABLET(S): at 11:33

## 2023-04-14 NOTE — PROGRESS NOTE ADULT - SUBJECTIVE AND OBJECTIVE BOX
S: 23yo PPD#1 s/p  c/w gestational thrombocytopenia & gdma1.  Patient feels well. Pain is well controlled. She is tolerating a regular diet and passing flatus. She is voiding spontaneously, and ambulating without difficulty. Denies CP/SOB. Denies lightheadedness/dizziness. Denies N/V.    O:  Vitals:  Vital Signs Last 24 Hrs  T(C): 37.3 (2023 05:38), Max: 37.3 (2023 05:38)  T(F): 99.2 (2023 05:38), Max: 99.2 (2023 05:38)  HR: 95 (2023 05:38) (76 - 134)  BP: 110/61 (2023 05:38) (82/45 - 181/91)  BP(mean): --  RR: 18 (2023 05:38) (16 - 18)  SpO2: 97% (2023 05:38) (89% - 99%)    Parameters below as of 2023 21:45  Patient On (Oxygen Delivery Method): room air        MEDICATIONS  (STANDING):  acetaminophen     Tablet .. 975 milliGRAM(s) Oral <User Schedule>  ibuprofen  Tablet. 600 milliGRAM(s) Oral every 6 hours  ketorolac   Injectable 30 milliGRAM(s) IV Push once  oxytocin Infusion 41.667 milliUNIT(s)/Min (125 mL/Hr) IV Continuous <Continuous>  prenatal multivitamin 1 Tablet(s) Oral daily  sodium chloride 0.9% lock flush 3 milliLiter(s) IV Push every 8 hours      Labs:  Blood type: O Positive  Rubella IgG: RPR: Negative                          13.8   9.46 >-----------< 90<L>    (  @ 19:20 )             42.7                        13.3   8.82 >-----------< 91<L>    (  @ 09:53 )             42.3                        13.8   6.94 >-----------< 101<L>    (  @ 23:40 )             41.6    23 @ 10:00      137  |  109<H>  |  8   ----------------------------<  102<H>  3.5   |  14<L>  |  0.38<L>        Ca    8.3<L>      2023 10:00    TPro  6.0  /  Alb  3.1<L>  /  TBili  0.3  /  DBili  x   /  AST  19  /  ALT  13  /  AlkPhos  210<H>  23 @ 10:00    Physical Exam:  General: NAD  Abdomen: soft, non-tender, non-distended, fundus firm  Vaginal: Lochia wnl  Extremities: No calf tenderness    A/P: 23yo PPD#1 s/p .  Patient is stable and doing well post-partum.     #gdma1  -GTT in 6 weeks   -Discussed increased risk T2DM    #Gestational thrombocytopenia  - Platelets stable 91-->90  - Asymptomatic    #Postpartum  - Pain well controlled, continue current pain regimen  - Increase ambulation, SCDs when not ambulating  - Continue regular diet  - Discharge planning    Mine JEREZ

## 2023-04-15 ENCOUNTER — TRANSCRIPTION ENCOUNTER (OUTPATIENT)
Age: 23
End: 2023-04-15

## 2023-04-15 VITALS
TEMPERATURE: 98 F | HEART RATE: 94 BPM | SYSTOLIC BLOOD PRESSURE: 106 MMHG | DIASTOLIC BLOOD PRESSURE: 64 MMHG | OXYGEN SATURATION: 99 % | RESPIRATION RATE: 18 BRPM

## 2023-04-15 RX ORDER — PRAMOXINE HYDROCHLORIDE 150 MG/15G
1 AEROSOL, FOAM RECTAL
Qty: 0 | Refills: 0 | DISCHARGE
Start: 2023-04-15

## 2023-04-15 RX ORDER — IBUPROFEN 200 MG
1 TABLET ORAL
Qty: 0 | Refills: 0 | DISCHARGE
Start: 2023-04-15

## 2023-04-15 RX ORDER — SIMETHICONE 80 MG/1
1 TABLET, CHEWABLE ORAL
Qty: 0 | Refills: 0 | DISCHARGE
Start: 2023-04-15

## 2023-04-15 RX ORDER — DIBUCAINE 1 %
1 OINTMENT (GRAM) RECTAL
Qty: 0 | Refills: 0 | DISCHARGE
Start: 2023-04-15

## 2023-04-15 RX ORDER — BENZOCAINE 10 %
1 GEL (GRAM) MUCOUS MEMBRANE
Qty: 0 | Refills: 0 | DISCHARGE
Start: 2023-04-15

## 2023-04-15 RX ADMIN — Medication 975 MILLIGRAM(S): at 15:29

## 2023-04-15 RX ADMIN — Medication 600 MILLIGRAM(S): at 01:12

## 2023-04-15 RX ADMIN — Medication 975 MILLIGRAM(S): at 16:00

## 2023-04-15 RX ADMIN — Medication 975 MILLIGRAM(S): at 11:30

## 2023-04-15 RX ADMIN — Medication 975 MILLIGRAM(S): at 10:46

## 2023-04-15 RX ADMIN — Medication 600 MILLIGRAM(S): at 06:26

## 2023-04-15 RX ADMIN — Medication 600 MILLIGRAM(S): at 06:55

## 2023-04-15 RX ADMIN — Medication 600 MILLIGRAM(S): at 18:15

## 2023-04-15 RX ADMIN — Medication 600 MILLIGRAM(S): at 12:57

## 2023-04-15 RX ADMIN — Medication 600 MILLIGRAM(S): at 00:35

## 2023-04-15 RX ADMIN — Medication 1 TABLET(S): at 12:56

## 2023-04-15 RX ADMIN — Medication 975 MILLIGRAM(S): at 03:01

## 2023-04-15 RX ADMIN — Medication 600 MILLIGRAM(S): at 17:42

## 2023-04-15 RX ADMIN — Medication 600 MILLIGRAM(S): at 13:30

## 2023-04-15 RX ADMIN — Medication 975 MILLIGRAM(S): at 03:35

## 2023-04-15 RX ADMIN — SODIUM CHLORIDE 3 MILLILITER(S): 9 INJECTION INTRAMUSCULAR; INTRAVENOUS; SUBCUTANEOUS at 14:23

## 2023-04-15 RX ADMIN — SODIUM CHLORIDE 3 MILLILITER(S): 9 INJECTION INTRAMUSCULAR; INTRAVENOUS; SUBCUTANEOUS at 06:26

## 2023-04-15 NOTE — PROGRESS NOTE ADULT - SUBJECTIVE AND OBJECTIVE BOX
pt is s/p    she is doing well    pain well controlled   on exm avss    breats nml b/l    abd is osft an dut is firm    lochia minimal    perineum is in healing process   a/p   s/p    d/c home f/up instructions given   jennifer ames md

## 2023-04-15 NOTE — DISCHARGE NOTE OB - MEDICATION SUMMARY - MEDICATIONS TO TAKE
I will START or STAY ON the medications listed below when I get home from the hospital:    ibuprofen 600 mg oral tablet  -- 1 tab(s) by mouth every 6 hours  -- Indication: For Normal vaginal delivery    benzocaine 20% topical spray  -- 1 Apply on skin to affected area every 6 hours As needed for Perineal discomfort  -- Indication: For Normal vaginal delivery    dibucaine 1% topical ointment  -- 1 Apply on skin to affected area every 6 hours As needed Perineal discomfort  -- Indication: For Normal vaginal delivery    pramoxine 1% topical cream  -- 1 Apply on skin to affected area every 4 hours As needed Moderate Pain (4-6)  -- Indication: For Normal vaginal delivery    Prenatal Multivitamins with Folic Acid 1 mg oral tablet  -- 1 tab(s) by mouth once a day  -- Indication: For Normal vaginal delivery    simethicone 80 mg oral tablet, chewable  -- 1 tab(s) by mouth every 4 hours As needed Gas  -- Indication: For Normal vaginal delivery

## 2023-04-15 NOTE — DISCHARGE NOTE OB - CARE PLAN
1 Principal Discharge DX:	Normal vaginal delivery  Assessment and plan of treatment:	pelvic  rest and f/up in 2 wks

## 2023-04-15 NOTE — LACTATION INITIAL EVALUATION - LACTATION INTERVENTIONS
discussed  slowing down  on formula as milk  comes  in  ./initiate/review safe skin-to-skin/initiate/review hand expression/initiate/review pumping guidelines and safe milk handling/initiate/review techniques for position and latch/review techniques to increase milk supply/initiate/review finger suck/initiate/review breast massage/compression/initiate/review alternate feeding method/reviewed components of an effective feeding and at least 8 effective feedings per day required/reviewed importance of monitoring infant diapers, the breastfeeding log, and minimum output each day/reviewed risks of unnecessary formula supplementation/reviewed risks of artificial nipples/reviewed strategies to transition to breastfeeding only/reviewed benefits and recommendations for rooming in/reviewed feeding on demand/by cue at least 8 times a day/recommended follow-up with pediatrician within 24 hours of discharge/reviewed indications of inadequate milk transfer that would require supplementation

## 2023-04-15 NOTE — DISCHARGE NOTE OB - PATIENT PORTAL LINK FT
You can access the FollowMyHealth Patient Portal offered by [facility] by registering at the following website: [email or web address]. By joining [employer]’s FollowMyHealth portal, you will also be able to view your health information using other applications (apps) compatible with our system.

## 2023-04-15 NOTE — DISCHARGE NOTE OB - CARE PROVIDER_API CALL
Erinn Padron)  Obstetrics and Gynecology  [address], [address]  [place] [address]  Phone: [phone]  Fax: [phone]  Follow Up Time:

## 2023-08-03 LAB — SURGICAL PATHOLOGY STUDY: SIGNIFICANT CHANGE UP
